# Patient Record
Sex: MALE | Race: WHITE | HISPANIC OR LATINO | URBAN - METROPOLITAN AREA
[De-identification: names, ages, dates, MRNs, and addresses within clinical notes are randomized per-mention and may not be internally consistent; named-entity substitution may affect disease eponyms.]

---

## 2023-04-07 ENCOUNTER — LAB (OUTPATIENT)
Dept: LAB | Facility: HOSPITAL | Age: 58
End: 2023-04-07
Payer: MEDICAID

## 2023-04-07 ENCOUNTER — OFFICE VISIT (OUTPATIENT)
Dept: ENDOCRINOLOGY | Age: 58
End: 2023-04-07
Payer: MEDICAID

## 2023-04-07 VITALS
BODY MASS INDEX: 32.98 KG/M2 | HEART RATE: 56 BPM | SYSTOLIC BLOOD PRESSURE: 104 MMHG | DIASTOLIC BLOOD PRESSURE: 76 MMHG | OXYGEN SATURATION: 97 % | HEIGHT: 68 IN | TEMPERATURE: 97.5 F | WEIGHT: 217.6 LBS

## 2023-04-07 DIAGNOSIS — E11.65 TYPE 2 DIABETES MELLITUS WITH HYPERGLYCEMIA, WITHOUT LONG-TERM CURRENT USE OF INSULIN: ICD-10-CM

## 2023-04-07 DIAGNOSIS — E11.65 TYPE 2 DIABETES MELLITUS WITH HYPERGLYCEMIA, WITHOUT LONG-TERM CURRENT USE OF INSULIN: Primary | ICD-10-CM

## 2023-04-07 LAB
HBA1C MFR BLD: 8.2 % (ref 4.8–5.6)
T4 FREE SERPL-MCNC: 1.25 NG/DL (ref 0.93–1.7)
TSH SERPL DL<=0.05 MIU/L-ACNC: 0.96 UIU/ML (ref 0.27–4.2)

## 2023-04-07 PROCEDURE — 3078F DIAST BP <80 MM HG: CPT | Performed by: INTERNAL MEDICINE

## 2023-04-07 PROCEDURE — 84439 ASSAY OF FREE THYROXINE: CPT | Performed by: INTERNAL MEDICINE

## 2023-04-07 PROCEDURE — 83036 HEMOGLOBIN GLYCOSYLATED A1C: CPT | Performed by: INTERNAL MEDICINE

## 2023-04-07 PROCEDURE — 84443 ASSAY THYROID STIM HORMONE: CPT | Performed by: INTERNAL MEDICINE

## 2023-04-07 PROCEDURE — 99204 OFFICE O/P NEW MOD 45 MIN: CPT | Performed by: INTERNAL MEDICINE

## 2023-04-07 PROCEDURE — 3074F SYST BP LT 130 MM HG: CPT | Performed by: INTERNAL MEDICINE

## 2023-04-07 PROCEDURE — 36415 COLL VENOUS BLD VENIPUNCTURE: CPT | Performed by: INTERNAL MEDICINE

## 2023-04-07 RX ORDER — OMEPRAZOLE 20 MG/1
CAPSULE, DELAYED RELEASE ORAL DAILY
COMMUNITY
Start: 2013-10-11

## 2023-04-07 RX ORDER — AMLODIPINE BESYLATE 5 MG/1
5 TABLET ORAL DAILY
COMMUNITY

## 2023-04-07 RX ORDER — ATORVASTATIN CALCIUM 20 MG/1
20 TABLET, FILM COATED ORAL DAILY
COMMUNITY

## 2023-04-07 NOTE — PROGRESS NOTES
New Patient      Chief Complaint    Chief Complaint   Patient presents with   • Diabetes     Testing bs 1 x day         HPI:   Berlin Walters is a 58 y.o. male sent to us for consultative evaluation and management of diabetes.  The patient speaks very little English and translation was done by his wife.  He was not sure when the diabetes was diagnosed and thought that it was maybe 2 or 3 years ago.  However, looking at his records, his A1c on March 29, 2016, was 11.2%.  He has complications of proliferative diabetic retinopathy and he has had some injections to his eyes.  His last dilated eye examination was in December 2022.  He does not have any evidence of diabetic polyneuropathy and he does not have any history of coronary heart disease or chronic kidney disease.  His creatinine on April 19, 2022, was 0.84 mg/dL with an estimated GFR of greater than 60.  For the treatment of his diabetes, he is currently on Invokana 300 mg daily; metformin 1000 mg 2 times daily and Januvia 100 mg daily.  He does self-monitoring blood glucose once daily.  We do not have any recent A1c on him.  His TSH on June 7, 2022 was 0.500 uIU/mL.          ROS:  Pertinent to this visit, only as mentioned above.  The rest was negative.    Social History     Socioeconomic History   • Marital status:        Physical Exam:  GENERAL: He looked well.  Obese.  Accompanied by his wife.  EXTREMITIES: He did not take off his shoes but said that his feet were fine    Assessment:  1.  Uncontrolled type 2 diabetes as mentioned above    Diagnoses and all orders for this visit:    1. Type 2 diabetes mellitus with hyperglycemia, without long-term current use of insulin (Primary)  -     Hemoglobin A1c  -     TSH  -     T4, Free  -     Hemoglobin A1c; Future         Recommendations:  1.  We reviewed with Mr. Walters as much as we could his medications and talked about the importance of getting the diabetes under good control.  2.  Given  that he is maxed out on 3 medications already that include metformin, Invokana and and Januvia, our recommendation was to add a long-acting bedtime insulin such as Lantus  3.  He was looking for medication to cure the diabetes and we discussed with him that there is no such medication currently available.  The medications we use simply help control the diabetes.  4.  We asked him to get an A1c today so we know where his level of control currently is with the medications that he is using.  So he is going to do that.  5.  After getting his A1c and because he does not like the idea of being on insulin he preferred to follow-up with his primary doctor.  6.  We gave him the opportunity to ask questions, which we answered.